# Patient Record
Sex: FEMALE | Race: WHITE | NOT HISPANIC OR LATINO | Employment: STUDENT | URBAN - METROPOLITAN AREA
[De-identification: names, ages, dates, MRNs, and addresses within clinical notes are randomized per-mention and may not be internally consistent; named-entity substitution may affect disease eponyms.]

---
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
## 2024-08-19 ENCOUNTER — TRANSCRIBE ORDERS (OUTPATIENT)
Dept: GENERAL RADIOLOGY | Facility: HOSPITAL | Age: 24
End: 2024-08-19
Payer: COMMERCIAL

## 2024-08-19 ENCOUNTER — HOSPITAL ENCOUNTER (OUTPATIENT)
Dept: GENERAL RADIOLOGY | Facility: HOSPITAL | Age: 24
Discharge: HOME | End: 2024-08-19
Admitting: NURSE PRACTITIONER
Payer: COMMERCIAL

## 2024-08-19 DIAGNOSIS — M25.561 RIGHT KNEE PAIN, UNSPECIFIED CHRONICITY: Primary | ICD-10-CM

## 2024-08-19 DIAGNOSIS — M25.561 RIGHT KNEE PAIN, UNSPECIFIED CHRONICITY: ICD-10-CM

## 2024-08-19 PROCEDURE — 73562 X-RAY EXAM OF KNEE 3: CPT

## 2024-12-04 ENCOUNTER — OFFICE VISIT (OUTPATIENT)
Dept: OBSTETRICS AND GYNECOLOGY | Facility: CLINIC | Age: 24
End: 2024-12-04
Payer: COMMERCIAL

## 2024-12-04 VITALS
WEIGHT: 194.4 LBS | HEIGHT: 62 IN | SYSTOLIC BLOOD PRESSURE: 110 MMHG | BODY MASS INDEX: 35.77 KG/M2 | DIASTOLIC BLOOD PRESSURE: 70 MMHG

## 2024-12-04 DIAGNOSIS — Z30.41 ENCOUNTER FOR SURVEILLANCE OF CONTRACEPTIVE PILLS: ICD-10-CM

## 2024-12-04 DIAGNOSIS — Z01.419 WOMEN'S ANNUAL ROUTINE GYNECOLOGICAL EXAMINATION: Primary | ICD-10-CM

## 2024-12-04 RX ORDER — NORETHINDRONE ACETATE AND ETHINYL ESTRADIOL AND FERROUS FUMARATE 1.5-30(21)
1 KIT ORAL DAILY
COMMUNITY
Start: 2024-10-14 | End: 2024-12-04 | Stop reason: SDUPTHER

## 2024-12-04 RX ORDER — NORETHINDRONE ACETATE AND ETHINYL ESTRADIOL AND FERROUS FUMARATE 1.5-30(21)
1 KIT ORAL DAILY
Qty: 90 TABLET | Refills: 3 | Status: SHIPPED | OUTPATIENT
Start: 2024-12-04

## 2024-12-04 NOTE — PROGRESS NOTES
Gynecologic Annual Exam Note        Gynecologic Exam        Subjective     HPI  Izzy Reddy is a 24 y.o. No obstetric history on file. female who presents for annual well woman exam as a new patient. There were no changes to her medical or surgical history since her last visit. Patient's last menstrual period was 11/29/2024 (exact date). Her periods occur every 3 months on current birth control., lasting 5 days.  The flow is light. She reports dysmenorrhea is mild occurring first 1-2 days of flow. Marital Status: single.  She has never been sexually active. STD testing recommendations have been explained to the patient and she does not desire STD testing.    The patient would like to discuss the following complaints today: None    Additional OB/GYN History   contraceptive methods: OCP (estrogen/progesterone)  Desires to: continue contraception  Thromboembolic Disease: none  History of migraines: no  Age of menarche: 15    History of STD: no    Last Pap : Never  Last Completed Pap Smear       This patient has no relevant Health Maintenance data.             History of abnormal Pap smear: no  Gardasil status: None  Family history of uterine, colon, breast, or ovarian cancer: yes - MGGM had breast cancer  Performs monthly Self-Breast Exam: no  Exercises Regularly:yes  Feelings of Anxiety or Depression: no  Tobacco Usage?: No       Current Outpatient Medications:     Junel FE 1.5/30 1.5-30 MG-MCG tablet, Take 1 tablet by mouth Daily. Skipping placebo pills, Disp: 90 tablet, Rfl: 3    ibuprofen (ADVIL,MOTRIN) 200 MG tablet, Take 2 tablets by mouth Every 6 (Six) Hours As Needed for Mild Pain. (Patient not taking: Reported on 12/4/2024), Disp: , Rfl:      Patient is requesting refills of birth control.    OB History    No obstetric history on file.         Health Maintenance   Topic Date Due    Annual Gynecologic Pelvic and Breast Exam  Never done    BMI FOLLOWUP  Never done    HPV VACCINES (1 - 3-dose series)  "Never done    INFLUENZA VACCINE  07/01/2024    COVID-19 Vaccine (2 - 2024-25 season) 09/01/2024    HEPATITIS C SCREENING  Never done    ANNUAL PHYSICAL  Never done    PAP SMEAR  Never done    TDAP/TD VACCINES (3 - Td or Tdap) 06/29/2033    MENINGOCOCCAL VACCINE  Completed    Pneumococcal Vaccine 0-64  Aged Out       History reviewed. No pertinent past medical history.     Past Surgical History:   Procedure Laterality Date    KNEE ACL RECONSTRUCTION Left 10/30/2018    Procedure: LT KNEE ARTHROSCOPIC ANTERIOR CRUCIATE LIGAMENT RECONSTRUCTION WITH AUTOGRAFT, PARTIAL MEDIAL AND LATERAL MENISECTOMY;  Surgeon: Grupo Callaway MD;  Location: [facility];  Service: Orthopedics    WISDOM TOOTH EXTRACTION  1/2024       The additional following portions of the patient's history were reviewed and updated as appropriate: allergies, current medications, past family history, past medical history, past social history, past surgical history, and problem list.    Review of Systems   All other systems reviewed and are negative.        I have reviewed and agree with the HPI, ROS, and historical information as entered above. Diana Conner MD          Objective   /70   Ht 157.5 cm (62.01\")   Wt 88.2 kg (194 lb 6.4 oz)   LMP 11/29/2024 (Exact Date)   BMI 35.55 kg/m²     Physical Exam  Vitals and nursing note reviewed. Exam conducted with a chaperone present.   Constitutional:       Appearance: She is well-developed.   HENT:      Head: Normocephalic and atraumatic.   Eyes:      Pupils: Pupils are equal, round, and reactive to light.   Neck:      Thyroid: No thyroid mass or thyromegaly.   Pulmonary:      Effort: Pulmonary effort is normal. No retractions.   Chest:      Chest wall: No mass.   Breasts:     Right: Normal. No mass, nipple discharge, skin change or tenderness.      Left: Normal. No mass, nipple discharge, skin change or tenderness.   Abdominal:      General: Bowel sounds are normal.      Palpations: Abdomen is " soft. Abdomen is not rigid. There is no mass.      Tenderness: There is no abdominal tenderness. There is no guarding.      Hernia: No hernia is present. There is no hernia in the left inguinal area or right inguinal area.   Genitourinary:     Exam position: Lithotomy position.      Pubic Area: No rash.       Labia:         Right: No rash, tenderness or lesion.         Left: No rash, tenderness or lesion.       Urethra: No urethral pain or urethral swelling.      Vagina: Normal. No vaginal discharge or lesions.      Cervix: No cervical motion tenderness, discharge, lesion or cervical bleeding.      Uterus: Normal. Not enlarged, not fixed and not tender.       Adnexa:         Right: No mass, tenderness or fullness.          Left: No mass, tenderness or fullness.        Rectum: No external hemorrhoid.   Musculoskeletal:      Cervical back: Normal range of motion. No muscular tenderness.      Right lower leg: No edema.      Left lower leg: No edema.   Skin:     General: Skin is warm and dry.   Neurological:      Mental Status: She is alert and oriented to person, place, and time.      Motor: Motor function is intact.   Psychiatric:         Mood and Affect: Mood and affect normal.         Behavior: Behavior normal.            Assessment and Plan    Problem List Items Addressed This Visit    None  Visit Diagnoses       Women's annual routine gynecological examination    -  Primary    Relevant Orders    LIQUID-BASED PAP SMEAR WITH HPV GENOTYPING IF ASCUS ([facility])    Encounter for surveillance of contraceptive pills                GYN annual well woman exam.   Reviewed pap guidelines.   Encouraged use of condoms for STD prevention.  OCP's/Vaginal Ring - Discussed side effects of nausea, BTB, headaches, breast tenderness and slight weight gain in the first three cycles.  Understands risks of blood clots, stroke, and theoretical risk of breast cancer.  Denies family history of blood clots.  Reviewed exercise as a  preventative health measures.   Return in about 1 year (around 12/4/2025) for Annual physical.    Diana Conner MD  12/04/2024

## 2024-12-06 ENCOUNTER — PATIENT ROUNDING (BHMG ONLY) (OUTPATIENT)
Dept: OBSTETRICS AND GYNECOLOGY | Facility: CLINIC | Age: 24
End: 2024-12-06
Payer: COMMERCIAL

## 2024-12-06 NOTE — PROGRESS NOTES
"December 6, 2024    Hello, may I speak with Izzy Reddy?    My name is Dianna    I am  with [facility]  [facility]  [address]  [facility] [address] [phone].    Before we get started may I verify your date of birth? 2000    I am calling to officially welcome you to our practice and ask about your recent visit. Is this a good time to talk? yes    Tell me about your visit with us. What things went well?  \" Dr Conner was very kind, and the nurse was as well\"       We're always looking for ways to make our patients' experiences even better. Do you have recommendations on ways we may improve?  no    Overall were you satisfied with your first visit to our practice? yes       I appreciate you taking the time to speak with me today. Is there anything else I can do for you? no      Thank you, and have a great day.      "

## 2024-12-09 LAB — REF LAB TEST METHOD: NORMAL

## 2025-07-21 RX ORDER — NORETHINDRONE ACETATE AND ETHINYL ESTRADIOL AND FERROUS FUMARATE 1.5-30(21)
1 KIT ORAL DAILY
Qty: 84 TABLET | Refills: 1 | Status: SHIPPED | OUTPATIENT
Start: 2025-07-21

## (undated) DEVICE — PK ACL 40

## (undated) DEVICE — 3M™ STERI-STRIP™ COMPOUND BENZOIN TINCTURE 40 BAGS/CARTON 4 CARTONS/CASE C1544: Brand: 3M™ STERI-STRIP™

## (undated) DEVICE — BUR SHAVER CLEARCUT 12FLUT 5MM 13CM

## (undated) DEVICE — Device: Brand: ACL DISPOSABLE PROCEDURE KIT

## (undated) DEVICE — GLV SURG TRIUMPH CLASSIC PF LTX 7.5 STRL

## (undated) DEVICE — VAPR TRIPOLAR 90 DEGREES SUCTION ELECTRODE 90 DEGREES SUCTION WITH INTEGRATED HANDPIECE AND HAND CONTROLS: Brand: VAPR

## (undated) DEVICE — DRSNG GZ PETROLTM XEROFORM CURAD 1X8IN STRL

## (undated) DEVICE — TUBING SET, GRAVITY, 4-SPIKE

## (undated) DEVICE — SUT VIC 2/0 CT1 CR8 18IN J839D

## (undated) DEVICE — BLD SHV DBL/CUT COOLCUT 4MM 13CM

## (undated) DEVICE — DRAPE,REIN 53X77,STERILE: Brand: MEDLINE

## (undated) DEVICE — UNDERCAST PADDING: Brand: DEROYAL

## (undated) DEVICE — SPNG LAP 18X18IN LF STRL PK/5

## (undated) DEVICE — GLV SURG BIOGEL LTX PF 8

## (undated) DEVICE — TUBING, SUCTION, 1/4" X 20', STRAIGHT: Brand: MEDLINE INDUSTRIES, INC.

## (undated) DEVICE — SKIN PREP TRAY W/CHG: Brand: MEDLINE INDUSTRIES, INC.

## (undated) DEVICE — REAMR LP SS 9MM

## (undated) DEVICE — PUDDLEVAC FLOOR SUCTION DEVICE: Brand: PUDDLEVAC

## (undated) DEVICE — SUT MNCRYL 3/0 PS2 18IN Y497G